# Patient Record
Sex: FEMALE | Race: WHITE | URBAN - METROPOLITAN AREA
[De-identification: names, ages, dates, MRNs, and addresses within clinical notes are randomized per-mention and may not be internally consistent; named-entity substitution may affect disease eponyms.]

---

## 2018-07-05 ENCOUNTER — OFFICE VISIT (OUTPATIENT)
Dept: FAMILY MEDICINE | Facility: CLINIC | Age: 23
End: 2018-07-05
Payer: COMMERCIAL

## 2018-07-05 VITALS
HEART RATE: 62 BPM | BODY MASS INDEX: 24.59 KG/M2 | OXYGEN SATURATION: 98 % | DIASTOLIC BLOOD PRESSURE: 64 MMHG | TEMPERATURE: 97.9 F | HEIGHT: 66 IN | WEIGHT: 153 LBS | SYSTOLIC BLOOD PRESSURE: 107 MMHG

## 2018-07-05 DIAGNOSIS — H10.021 PINK EYE DISEASE OF RIGHT EYE: Primary | ICD-10-CM

## 2018-07-05 PROCEDURE — 99213 OFFICE O/P EST LOW 20 MIN: CPT | Performed by: FAMILY MEDICINE

## 2018-07-05 RX ORDER — NEOMYCIN SULFATE, POLYMYXIN B SULFATE AND GRAMICIDIN 1.75; 10000; .025 MG/ML; [USP'U]/ML; MG/ML
1-2 SOLUTION/ DROPS OPHTHALMIC 4 TIMES DAILY
Qty: 1 BOTTLE | Refills: 3 | Status: SHIPPED | OUTPATIENT
Start: 2018-07-05

## 2018-07-05 NOTE — LETTER
[address] [address]  Phone: [phone]    July 5, 2018        Gladys Soto  [address]  [place] [address]        To whom it may concern:    RE: Gladys Soto    Patient was seen and treated today at our clinic and missed work.  Patient may return to work  with the following:  No restrictions when symptoms improve.    Please contact me for questions or concerns.      Sincerely,        Loida Tariq MD

## 2018-07-05 NOTE — MR AVS SNAPSHOT
After Visit Summary   7/5/2018    Gladys Soto    MRN: [id number]           Patient Information     Date Of Birth          1995        Visit Information        Provider Department      7/5/2018 9:00 AM Loida Tariq MD [facility]        Today's Diagnoses     Pink eye disease of right eye    -  1      Care Instructions    Home Treatment    Home treatment for pinkeye will help reduce your pain and keep your eye free of drainage. If you wear contacts, remove them and wear glasses until your symptoms have gone away completely. Thoroughly clean your contacts and storage case.  Cold compresses or warm compresses (whichever feels best) can be used. If an allergy is the problem, a cool compress may feel better. If the pinkeye is caused by an infection, a warm, moist compress may soothe your eye and help reduce redness and swelling. Warm, moist compresses can spread infection from one eye to the other. Use a different compress for each eye, and use a clean compress for each application.  When cleaning your eye, wipe from the inside  (next to the nose) toward the outside. Use a clean surface for each wipe so that drainage being cleaned away is not rubbed back across the eye.   If tissues or wipes are used, make sure they are put in the trash and not allowed to sit around. If washcloths are used to clean the eye, put them in the laundry right away so that no one else picks them up or uses them. After wiping your eye, wash your hands to prevent the pinkeye from spreading.  After pinkeye has been diagnosed:  Take steps to prevent the spread of pinkeye by following the instructions in the Prevention section of this topic.   Do not go to day care or school or go to work until pinkeye has improved.   You can usually return to day care, school, or work when symptoms begin to improve, typically in 3 to 5 days. Medicines are not usually used to treat viral pinkeye, so preventing its spread  "is important. Home treatment of the symptoms will help you feel more comfortable while the infection goes away.               Follow-ups after your visit        Who to contact     If you have questions or need follow up information about today's clinic visit or your schedule please contact [facility] directly at [phone].  Normal or non-critical lab and imaging results will be communicated to you by MyChart, letter or phone within 4 business days after the clinic has received the results. If you do not hear from us within 7 days, please contact the clinic through MyChart or phone. If you have a critical or abnormal lab result, we will notify you by phone as soon as possible.  Submit refill requests through [email or web address] or call your pharmacy and they will forward the refill request to us. Please allow 3 business days for your refill to be completed.          Additional Information About Your Visit        Care EveryWhere ID     This is your Care EveryWhere ID. This could be used by other organizations to access your [facility] medical records  [id number]        Your Vitals Were     Pulse Temperature Height Last Period Pulse Oximetry BMI (Body Mass Index)    62 97.9  F (36.6  C) (Oral) 5' 6\" (1.676 m) 06/25/2018 (Approximate) 98% 24.69 kg/m2       Blood Pressure from Last 3 Encounters:   07/05/18 107/64   12/04/16 104/60   11/12/16 128/83    Weight from Last 3 Encounters:   07/05/18 153 lb (69.4 kg)   12/04/16 142 lb (64.4 kg) (72 %)*   11/12/16 147 lb (66.7 kg) (78 %)*     * Growth percentiles are based on CDC 2-20 Years data.              Today, you had the following     No orders found for display         Today's Medication Changes          These changes are accurate as of 7/5/18  9:41 AM.  If you have any questions, ask your nurse or doctor.               Start taking these medicines.        Dose/Directions    neomycin-polymyxin-gramicidin 1.75-08729-.025 ophthalmic solution   Commonly known as:  " NEOSPORIN   Used for:  Pink eye disease of right eye   Started by:  Loida Tariq MD        Dose:  1-2 drop   Place 1-2 drops into the right eye 4 times daily   Quantity:  1 Bottle   Refills:  3            Where to get your medicines      These medications were sent to [facility] [id number] [facility] - [place], [place] - [address]  [address], [place] [address]     Phone:  [phone]     neomycin-polymyxin-gramicidin [id number] ophthalmic solution                Primary Care Provider Office Phone # Fax #    Loida Tariq [phone]       [address]  [facility] [address]        Equal Access to Services     MARIXA DUBOSE : Hadii joseph mendoza hadasho Soricky, waaxda luqadaha, qaybta kaalmada adeegyada, memo nathan . So [facility] [phone].    ATENCIÓN: Si habla español, tiene a agrawal disposición servicios gratuitos de asistencia lingüística. [facility] [phone].    We comply with applicable federal civil rights laws and [place] laws. We do not discriminate on the basis of race, color, national origin, age, disability, sex, sexual orientation, or gender identity.            Thank you!     Thank you for choosing [facility]  for your care. Our goal is always to provide you with excellent care. Hearing back from our patients is one way we can continue to improve our services. Please take a few minutes to complete the written survey that you may receive in the mail after your visit with us. Thank you!             Your Updated Medication List - Protect others around you: Learn how to safely use, store and throw away your medicines at www.disposemymeds.org.          This list is accurate as of 7/5/18  9:41 AM.  Always use your most recent med list.                   Brand Name Dispense Instructions for use Diagnosis    levothyroxine 25 mcg/mL Susp    SYNTHROID     Take 25 mcg by mouth every morning (before breakfast)         neomycin-polymyxin-gramicidin 1.75-93821-.025 ophthalmic solution    NEOSPORIN    1 Bottle    Place 1-2 drops into the right eye 4 times daily    Pink eye disease of right eye       norgestimate-ethinyl estradiol 0.25-35 MG-MCG per tablet    ORTHO-CYCLEN, SPRINTEC     Take 1 tablet by mouth daily        sulfamethoxazole-trimethoprim 800-160 MG per tablet    BACTRIM DS    10 tablet    Take 1 tablet by mouth 2 times daily    Acute cystitis without hematuria

## 2018-07-05 NOTE — PROGRESS NOTES
"  SUBJECTIVE:   Gladys Soto is a 23 year old female who presents to clinic today for the following health issues:    Eye(s) Problem  Onset: yesterday     Description:   Location: right  Pain: YES- this morning   Redness: YES    Accompanying Signs & Symptoms:  Discharge/mattering: YES- extra tears but nothing else   Swelling: YES- eye lid   Visual changes: no   Fever: YES- just a head cold, runny nose  Nasal Congestion: YES  Bothered by bright lights: YES    History:   Trauma: no   Foreign body exposure: no     Precipitating factors:   Wearing contacts: YES- wearing glasses today     Alleviating factors:  Improved by: sensitive so she doesn't want to touch it      No Known Allergies   Past Medical History:   Diagnosis Date     NO ACTIVE PROBLEMS       Past Surgical History:   Procedure Laterality Date     TONSILLECTOMY  06/2005      OBJECTIVE:  /64  Pulse 62  Temp 97.9  F (36.6  C) (Oral)  Ht 5' 6\" (1.676 m)  Wt 153 lb (69.4 kg)  LMP 06/25/2018 (Approximate)  SpO2 98%  BMI 24.69 kg/m2   Patient is alert, cooperative, pleasant, in no acute distress.   Wincing at light  EYES: right eye blepharal and scleral injection - no crusting or discharge.  No periorbital edema. PERRLA, EOMI. Iris appears normal.  The neck is supple and free of adenopathy or masses, the thyroid is normal without enlargement or nodules.   Heart: regular rate and rhythm with no murmurs, clicks or rubs. No carotid bruits noted.  Lungs: Clear to ausculatation in all fields with no wheezes or rales, normal respiratory effort.         ASSESSMENT / PLAN:  (H10.021) Pink eye disease of right eye  (primary encounter diagnosis)  Comment: probably viral but can start treatment as needed (in wedding in 10 days)  May use bacitracin as well if neosporin too expensive  Plan: neomycin-polymyxin-gramicidin (NEOSPORIN)         [id number] ophthalmic solution  Please see patient instructions below which I reviewed with Gladys.         The patient " indicates understanding of these issues and agrees with the plan.       Patient Instructions   Home Treatment    Home treatment for pinkeye will help reduce your pain and keep your eye free of drainage. If you wear contacts, remove them and wear glasses until your symptoms have gone away completely. Thoroughly clean your contacts and storage case.  Cold compresses or warm compresses (whichever feels best) can be used. If an allergy is the problem, a cool compress may feel better. If the pinkeye is caused by an infection, a warm, moist compress may soothe your eye and help reduce redness and swelling. Warm, moist compresses can spread infection from one eye to the other. Use a different compress for each eye, and use a clean compress for each application.  When cleaning your eye, wipe from the inside  (next to the nose) toward the outside. Use a clean surface for each wipe so that drainage being cleaned away is not rubbed back across the eye.   If tissues or wipes are used, make sure they are put in the trash and not allowed to sit around. If washcloths are used to clean the eye, put them in the laundry right away so that no one else picks them up or uses them. After wiping your eye, wash your hands to prevent the pinkeye from spreading.  After pinkeye has been diagnosed:  Take steps to prevent the spread of pinkeye by following the instructions in the Prevention section of this topic.   Do not go to day care or school or go to work until pinkeye has improved.   You can usually return to day care, school, or work when symptoms begin to improve, typically in 3 to 5 days. Medicines are not usually used to treat viral pinkeye, so preventing its spread is important. Home treatment of the symptoms will help you feel more comfortable while the infection goes away.         Patient education re: conjunctivitis, viral vs bacterial, treatment, precautions and prevention of spread to others.  return to clinic within week if no  improvement of worsening

## 2018-07-31 ENCOUNTER — TELEPHONE (OUTPATIENT)
Dept: FAMILY MEDICINE | Facility: CLINIC | Age: 23
End: 2018-07-31